# Patient Record
Sex: MALE | Race: BLACK OR AFRICAN AMERICAN | URBAN - METROPOLITAN AREA
[De-identification: names, ages, dates, MRNs, and addresses within clinical notes are randomized per-mention and may not be internally consistent; named-entity substitution may affect disease eponyms.]

---

## 2017-12-14 ENCOUNTER — OFFICE VISIT (OUTPATIENT)
Dept: FAMILY MEDICINE CLINIC | Age: 37
End: 2017-12-14

## 2017-12-14 VITALS
DIASTOLIC BLOOD PRESSURE: 95 MMHG | WEIGHT: 181 LBS | SYSTOLIC BLOOD PRESSURE: 151 MMHG | OXYGEN SATURATION: 100 % | TEMPERATURE: 97.9 F | HEART RATE: 83 BPM | BODY MASS INDEX: 25.91 KG/M2 | HEIGHT: 70 IN | RESPIRATION RATE: 17 BRPM

## 2017-12-14 DIAGNOSIS — Z00.00 REGULAR CHECK-UP: Primary | ICD-10-CM

## 2017-12-14 DIAGNOSIS — E11.59 TYPE 2 DIABETES MELLITUS WITH OTHER CIRCULATORY COMPLICATION, WITHOUT LONG-TERM CURRENT USE OF INSULIN (HCC): ICD-10-CM

## 2017-12-14 PROBLEM — E11.9 DIABETES MELLITUS (HCC): Status: ACTIVE | Noted: 2017-12-14

## 2017-12-14 LAB — GLUCOSE POC: 242 MG/DL

## 2017-12-14 RX ORDER — INSULIN GLARGINE 100 [IU]/ML
INJECTION, SOLUTION SUBCUTANEOUS
Qty: 1 VIAL | Refills: 0 | Status: SHIPPED | COMMUNITY
Start: 2017-12-14 | End: 2018-01-31 | Stop reason: SDUPTHER

## 2017-12-14 RX ORDER — METFORMIN HYDROCHLORIDE 500 MG/1
TABLET ORAL 2 TIMES DAILY WITH MEALS
COMMUNITY
End: 2017-12-14 | Stop reason: SINTOL

## 2017-12-14 RX ORDER — METFORMIN HYDROCHLORIDE 500 MG/1
500 TABLET, EXTENDED RELEASE ORAL
Qty: 30 TAB | Refills: 5 | Status: SHIPPED | OUTPATIENT
Start: 2017-12-14

## 2017-12-14 NOTE — PROGRESS NOTES
Discharge instructions reviewed with patient    Medication list and understanding of medications reviewed with patient. OTC and herbal medications reviewed and added to med list if applicable  Barriers to adherence assessed. Guidance given regarding new medications this visit, including reason for taking this medicine, and common side effects. Given Diabetes education with handouts, verbal and info on the Diabetes classes, patient very motivated. Thank you for returning your application for medication assistance. We will fax your application to the [facility] prescription , Deloris Patel. It may take anywhere from 3 to 6 weeks for your application to be approved. THE FIRST FILL OF YOUR MEDICINE WILL BE DELIVERED TO THE [employer] [facility] AUTOMATICALLY. CALL [phone] OR [phone]   TO ASK FOR REFILLS WHEN YOU HAVE ONE MONTH   OF MEDICINE LEFT. Think of it the same way as when you call your regular pharmacy to order your medicine refills.     For [place]

## 2017-12-14 NOTE — PROGRESS NOTES
HPI  Carrol Lazaro is a 40 y.o. male being seen today for   Chief Complaint   Patient presents with    Diabetes   . he states that he is here for diabetes treatment. He is type 2 x 6 years or so. Worked on diet, lost weight but still with high sugars. Has been prescribed insulin but never could afford. Whole family has diabetes. a1c was 12 a few weeks ago. Recently had toe amputated. He is ready to get serious about his health. Past Medical History:   Diagnosis Date    Diabetes ([address]     Hypertension          ROS  Patient states that he is feeling well. Denies complaints of chest pain, shortness of breath, swelling of legs, dizziness or weakness. he denies nausea, vomiting or diarrhea. Current Outpatient Prescriptions   Medication Sig    metFORMIN ER (GLUCOPHAGE XR) 500 mg tablet Take 1 Tab by mouth daily (with dinner).  insulin glargine (LANTUS) 100 unit/mL injection 10 units daily    Insulin Syringes, Disposable, 1 mL syrg Use as directed     No current facility-administered medications for this visit. PE  Visit Vitals    BP (!) 151/95 (BP 1 Location: Left arm, BP Patient Position: Sitting)    Pulse 83    Temp 97.9 °F (36.6 °C) (Oral)    Resp 17    Ht 5' 10\" (1.778 m)    Wt 181 lb (82.1 kg)    SpO2 100%    BMI 25.97 kg/m2        Alert and oriented with normal mood and affect. he is well developed and well nourished . Lungs are clear without wheezing. Heart rate is regular without murmurs or gallops. There is no lower extremity edema. Results for orders placed or performed in visit on 12/14/17   AMB POC GLUCOSE BLOOD, BY GLUCOSE MONITORING DEVICE   Result Value Ref Range    Glucose  mg/dL         Assessment and Plan:        ICD-10-CM ICD-9-CM    1. Regular check-up Z00.00 V70.0 AMB POC GLUCOSE BLOOD, BY GLUCOSE MONITORING DEVICE   2.  Type 2 diabetes mellitus with other circulatory complication, without long-term current use of insulin ([facility]) E11.59 250.70 Sample insulin today (lantus) start 10 units but will likely need more.    PAP for lantus 20    Follow up in about a month, recheck sugars and bp    Mary Linda MD

## 2017-12-19 ENCOUNTER — DOCUMENTATION ONLY (OUTPATIENT)
Dept: FAMILY MEDICINE CLINIC | Age: 37
End: 2017-12-19

## 2017-12-19 NOTE — PROGRESS NOTES
Received completed Pharmacy Assistance Program Application. Patient needs assistance with Basaglar 100 units/mL 20 units daily by SC routed. Application faxed to [employer] for review. Fax confirmation received.

## 2018-01-22 NOTE — TELEPHONE ENCOUNTER
BIJAL SENT PATIENT MARZENA MONTALVO 10 3ML BASAGLAR PENS THROUGH THE pap. LETTER SENT TO PATIENT FOR  AND ORDER ROUTED TO PROVIDER.

## 2018-01-22 NOTE — LETTER
1/22/2018 10:55 AM 
 
Mr. Natanael Cooney [id number] Thank you for participating in the [address] [place] [address] Patient Assistance Program. 
 
This is notification that your item(s) was delivered to us. Please  your item(s) between 11:00 am and 1:00 pm, at one of our [facility] sites within 14 days. When you arrive, you will need to register inside as a \"nurse visit\" to  your medication. Notify the registrar that you are there to  medication and they will register you as soon as possible. There may be a short wait but we try to make the process as fast as possible. If it has been more than 3 months since you saw the doctor, please come early and plan to stay for an office visit on the day you  your medicine. If you fail to follow-up for regular appointments, the [address] [place] [address] may discontinue providing your medication. To see when the [address] will be in your neighborhood, go to 
[email or web address] 
or call [phone], select your language (1 for english or 2 for Malian), and then option 2. Sincerely, Abbey Fothergill, MD

## 2018-01-31 RX ORDER — INSULIN GLARGINE 100 [IU]/ML
INJECTION, SOLUTION SUBCUTANEOUS
Qty: 10 PEN | Refills: 0 | Status: SHIPPED | COMMUNITY
Start: 2018-01-31

## 2018-01-31 NOTE — TELEPHONE ENCOUNTER
Med list and chart notes reviewed.   Pharmacy Assistance Medication approved for pickup.    lantus 10 units daily

## 2019-03-25 ENCOUNTER — TELEPHONE (OUTPATIENT)
Dept: FAMILY MEDICINE CLINIC | Age: 39
End: 2019-03-25

## 2019-03-25 NOTE — TELEPHONE ENCOUNTER
I attempted to contact this patient at all the phone numbers he has on file but none were valid. Letter had been sent to patient at last known address advising him of medication which needed to be picked up.

## 2019-03-27 ENCOUNTER — TELEPHONE (OUTPATIENT)
Dept: FAMILY MEDICINE CLINIC | Age: 39
End: 2019-03-27

## 2019-03-27 NOTE — TELEPHONE ENCOUNTER
Contacted Mauricio Chapman, the patient's emergency contact to ask if she could reach out to her son Ashly Vanegas and ask him to call us at the office. Mauricio Chapman related that she would pass the message on to Ashly Vanegas.